# Patient Record
Sex: MALE | URBAN - METROPOLITAN AREA
[De-identification: names, ages, dates, MRNs, and addresses within clinical notes are randomized per-mention and may not be internally consistent; named-entity substitution may affect disease eponyms.]

---

## 2020-09-14 ENCOUNTER — APPOINTMENT (RX ONLY)
Dept: URBAN - METROPOLITAN AREA CLINIC 12 | Facility: CLINIC | Age: 85
Setting detail: DERMATOLOGY
End: 2020-09-14

## 2020-09-14 DIAGNOSIS — L20.89 OTHER ATOPIC DERMATITIS: ICD-10-CM

## 2020-09-14 PROBLEM — L20.84 INTRINSIC (ALLERGIC) ECZEMA: Status: ACTIVE | Noted: 2020-09-14

## 2020-09-14 PROCEDURE — 99202 OFFICE O/P NEW SF 15 MIN: CPT

## 2020-09-14 PROCEDURE — ? PRESCRIPTION

## 2020-09-14 PROCEDURE — ? DIAGNOSIS COMMENT

## 2020-09-14 PROCEDURE — ? COUNSELING

## 2020-09-14 RX ORDER — MOMETASONE FUROATE 1 MG/G
CREAM TOPICAL
Qty: 1 | Refills: 3 | Status: ERX | COMMUNITY
Start: 2020-09-14

## 2020-09-14 RX ADMIN — MOMETASONE FUROATE: 1 CREAM TOPICAL at 00:00

## 2020-09-14 NOTE — PROCEDURE: COUNSELING
Detail Level: Detailed
Patient Specific Counseling (Will Not Stick From Patient To Patient): Change to a laundry detergent that is free of dyes and fragrance.  RECOMMENDED PRODUCTS ARE:\\Lex FREE AND CLEAR DETERGENT\\nTIDE FREE AND CLEAR DETERGENT

## 2024-07-23 NOTE — HPI: RASH
[facility] Note    Patient: Matt Garcia Date of Service: 2024   : 1979 MRN: [id number]     SUBJECTIVE:     Matt Garcia is a 45 year old male who presents today for   Chief Complaint   Patient presents with   • Ear Pain     X  Today  /RT    • Mouth/Lip Problem     X  2 days ago  / white film on tongue       2 days of white film on tongue- no pain, overall improving, right ear pain, normal hearing, no ear dc, no steroids or inhalers, no fever, feel well otherwie         Past Medical History:   Diagnosis Date   • No known problems        Current Outpatient Medications   Medication Sig   • traZODone (DESYREL) 50 MG tablet Take 50 mg by mouth nightly.   • amphetamine-dextroamphetamine (ADDERALL XR) 30 MG 24 hr capsule Take 30 mg by mouth daily.   • amphetamine-dextroamphetamine (ADDERALL) 20 MG tablet Take 20 mg by mouth as needed.     No current facility-administered medications for this visit.       ALLERGIES:   Allergen Reactions   • Sulfa Antibiotics Other (See Comments)       No past surgical history on file.    No family history on file.    Social History     Tobacco Use   • Smoking status: Never   • Smokeless tobacco: Never   • Tobacco comments:     Vapes marijuana occasionally    Vaping Use   • Vaping status: Every Day   • Substances: THC   Substance Use Topics   • Alcohol use: Yes     Comment: occasional   • Drug use: Yes     Comment: thc       Patient's medications, allergies, past medical, surgical, social and family histories were reviewed and updated as appropriate.    Review of Systems:  Review of Systems   Constitutional:  Negative for fatigue.   HENT:  Positive for ear pain. Negative for rhinorrhea and sore throat.         Oral film   Respiratory:  Negative for cough and shortness of breath.    Cardiovascular:  Negative for chest pain.   Gastrointestinal:  Negative for abdominal pain, nausea and vomiting.   Genitourinary:  Negative for difficulty urinating. 
What Type Of Note Output Would You Prefer (Optional)?: Bullet Format
  Musculoskeletal:  Negative for arthralgias.   Skin:  Negative for rash.   Neurological:  Negative for headaches.   Psychiatric/Behavioral:  The patient is not nervous/anxious.      All other ROS reviewed negative except as mentioned in HPI     OBJECTIVE:     Vitals:    07/22/24 1927   BP: (!) 159/90   Pulse: 81   Resp: 18   Temp: 97.9 °F (36.6 °C)   TempSrc: Oral   SpO2: 99%   PainSc: 6          Physical Exam  Vitals and nursing note reviewed.   Constitutional:       Appearance: Normal appearance.   HENT:      Head: Normocephalic.      Right Ear: There is impacted cerumen.      Left Ear: Tympanic membrane normal.      Nose: Nose normal.      Mouth/Throat:      Mouth: Mucous membranes are moist.      Pharynx: No posterior oropharyngeal erythema.      Comments: No tongue abnormalities  Eyes:      Extraocular Movements: Extraocular movements intact.   Cardiovascular:      Rate and Rhythm: Normal rate and regular rhythm.      Heart sounds: Normal heart sounds.   Pulmonary:      Effort: Pulmonary effort is normal.      Breath sounds: Normal breath sounds.   Musculoskeletal:         General: Normal range of motion.   Skin:     General: Skin is warm.      Capillary Refill: Capillary refill takes less than 2 seconds.   Neurological:      General: No focal deficit present.      Mental Status: He is alert and oriented to person, place, and time.   Psychiatric:         Mood and Affect: Mood normal.         Behavior: Behavior normal.           DIAGNOSTIC STUDIES:     Lab results:  No results found for this visit on 07/22/24.     Imaging: none today    ASSESSMENT AND PLAN:   Procedures     1. Impacted cerumen of right ear    Well-appearing 45-year-old male presents with 2 days of a \"white film on his tongue \".  He reports no pain to his tongue.  He reports is overall improving.  He also complains of right ear pain with normal hearing.  Has had no ear discharge.  He does not use nasal steroids or inhalers.  He has had no fever 
Is The Patient Presenting As Previously Scheduled?: Yes
How Severe Is Your Rash?: mild
he feels well.  His vital signs significant elevated blood pressure.  On exam there is no respiratory distress there is no increased work of breathing there is no abnormal breath or heart sounds.  His oral mucosa is moist his tongue is normal in appearance there is no lesions or abnormal color or texture to the tongue.  There is no cervical adenopathy.  His left TM is normal his right TM is occluded by wax.  Ear irrigation is performed by the [facility] I also remove additional wax with alligator forceps.  The TM is intact after irrigation patient reports improvement in his hearing.  Patient will follow-up as needed for ear pain or tongue pain or other concerns he expressed understanding of plan.    Problem List Items Addressed This Visit    None  Visit Diagnoses     Impacted cerumen of right ear    -  Primary    Relevant Orders    Nursing communication             Summary of your Discharge Medications          Accurate as of July 22, 2024  9:13 PM. Always use your most recent med list.            Take these Medications      Details   * amphetamine-dextroamphetamine 30 MG 24 hr capsule  Commonly known as: ADDERALL XR   Take 30 mg by mouth daily.     * amphetamine-dextroamphetamine 20 MG tablet  Commonly known as: ADDERALL   Take 20 mg by mouth as needed.     traZODone 50 MG tablet  Commonly known as: DESYREL   Take 50 mg by mouth nightly.         * This list has 2 medication(s) that are the same as other medications prescribed for you. Read the directions carefully, and ask your doctor or other care provider to review them with you.                 Instructions provided as documented in the AVS.    The patient indicated understanding of the diagnosis and agreed with the plan of care.    Follow-up Information    None            HIRAL Mason  07/22/24 9:13 PM  
Is This A New Presentation, Or A Follow-Up?: Rash